# Patient Record
Sex: FEMALE | Race: WHITE | URBAN - METROPOLITAN AREA
[De-identification: names, ages, dates, MRNs, and addresses within clinical notes are randomized per-mention and may not be internally consistent; named-entity substitution may affect disease eponyms.]

---

## 2022-07-19 PROBLEM — E66.3 OVERWEIGHT: Status: ACTIVE | Noted: 2018-12-07

## 2022-07-19 PROBLEM — E66.3 BMI OVER RECOMMENDED: Status: ACTIVE | Noted: 2018-02-12

## 2023-11-02 ENCOUNTER — AMBULATORY SURGICAL CENTER (OUTPATIENT)
Dept: URBAN - METROPOLITAN AREA SURGERY 7 | Facility: SURGERY | Age: 43
End: 2023-11-02

## 2023-11-02 VITALS
SYSTOLIC BLOOD PRESSURE: 131 MMHG | RESPIRATION RATE: 15 BRPM | HEART RATE: 85 BPM | OXYGEN SATURATION: 99 % | DIASTOLIC BLOOD PRESSURE: 85 MMHG | HEART RATE: 81 BPM | RESPIRATION RATE: 16 BRPM | DIASTOLIC BLOOD PRESSURE: 70 MMHG | SYSTOLIC BLOOD PRESSURE: 106 MMHG | SYSTOLIC BLOOD PRESSURE: 106 MMHG | SYSTOLIC BLOOD PRESSURE: 148 MMHG | OXYGEN SATURATION: 100 % | HEART RATE: 95 BPM | HEART RATE: 83 BPM | HEART RATE: 84 BPM | OXYGEN SATURATION: 96 % | HEART RATE: 81 BPM | RESPIRATION RATE: 20 BRPM | SYSTOLIC BLOOD PRESSURE: 98 MMHG | HEART RATE: 85 BPM | HEIGHT: 65 IN | SYSTOLIC BLOOD PRESSURE: 124 MMHG | HEART RATE: 93 BPM | SYSTOLIC BLOOD PRESSURE: 102 MMHG | HEART RATE: 84 BPM | RESPIRATION RATE: 16 BRPM | TEMPERATURE: 97.3 F | HEIGHT: 65 IN | RESPIRATION RATE: 15 BRPM | SYSTOLIC BLOOD PRESSURE: 131 MMHG | RESPIRATION RATE: 20 BRPM | WEIGHT: 220 LBS | DIASTOLIC BLOOD PRESSURE: 85 MMHG | DIASTOLIC BLOOD PRESSURE: 71 MMHG | OXYGEN SATURATION: 99 % | SYSTOLIC BLOOD PRESSURE: 124 MMHG | OXYGEN SATURATION: 96 % | DIASTOLIC BLOOD PRESSURE: 66 MMHG | WEIGHT: 220 LBS | DIASTOLIC BLOOD PRESSURE: 79 MMHG | TEMPERATURE: 97.3 F | HEART RATE: 93 BPM | SYSTOLIC BLOOD PRESSURE: 143 MMHG | HEART RATE: 83 BPM | SYSTOLIC BLOOD PRESSURE: 143 MMHG | SYSTOLIC BLOOD PRESSURE: 98 MMHG | OXYGEN SATURATION: 98 % | OXYGEN SATURATION: 98 % | HEART RATE: 95 BPM | DIASTOLIC BLOOD PRESSURE: 79 MMHG | DIASTOLIC BLOOD PRESSURE: 71 MMHG | OXYGEN SATURATION: 100 % | DIASTOLIC BLOOD PRESSURE: 66 MMHG | DIASTOLIC BLOOD PRESSURE: 64 MMHG | DIASTOLIC BLOOD PRESSURE: 64 MMHG | SYSTOLIC BLOOD PRESSURE: 102 MMHG | SYSTOLIC BLOOD PRESSURE: 148 MMHG | DIASTOLIC BLOOD PRESSURE: 70 MMHG

## 2023-11-02 DIAGNOSIS — Z12.11 ENCOUNTER FOR SCREENING FOR MALIGNANT NEOPLASM OF COLON: ICD-10-CM

## 2023-11-02 DIAGNOSIS — K57.30 DIVERTICULOSIS OF LARGE INTESTINE WITHOUT PERFORATION OR ABS: ICD-10-CM

## 2023-11-02 DIAGNOSIS — Z83.719 FAMILY HISTORY OF COLON POLYPS, UNSPECIFIED: ICD-10-CM

## 2023-11-02 PROCEDURE — 45378 DIAGNOSTIC COLONOSCOPY: CPT | Mod: 33 | Performed by: INTERNAL MEDICINE

## 2023-11-02 PROCEDURE — 45378 DIAGNOSTIC COLONOSCOPY: CPT | Performed by: INTERNAL MEDICINE

## 2024-02-09 NOTE — PROGRESS NOTES
RHEUMATOLOGY NEW PATIENT VISIT    2024      Patient Name: Nita Ayala  : 1980  Medical Record: [id number]      CHIEF COMPLAINT  ELIUD positive  Thyroid peroxidase 483, positive    Pertinent Problems  Celiacs disease     HISTORY OF PRESENT ILLNESS    Nita Ayala is a 43 y.o. female who was referred by Karo Kc. She has been having fatigue that has been going on for one year. Of note she is aware of  her diagnosis of hashimoto about 3 years ago for which she takes Synthroid. ELIUD returned positive.     There is bilateral knee stiffness \" sometimes they feel inflamed\"   Stiffness is worse in the a.m lasting an hour  Overall Discomfort: 2-3/10   Improved with: activity   Worse with: prolonged sitting      Hair loss: Denies  Oral Ulcers: +  Dry eyes and mouth: Dry eyes +   Rashes: Denies  Photosensitivity: Denies   Photophobia: Denies  Joint Pains: Denies  Raynaud's: Denies  Chest Pain: Denies  SOB: Denies  Miscarriages: Denies  Blood Clots: Denies  Seizures/strokes: Denies  Kidney Disease: Acute on chronic kidney disease: denies  Anemia/thrombocytopenia/leukopenia: Denies  Antibodies: ELIUD positive    Denies smoking, etoh, recreational drug use. Mother has celiac's disease, Hashimotos disease, autoimmune hepatitis. 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     Current rheum meds:  none    Past rheum meds:          No data to display                    REVIEW OF SYSTEMS     Constitutional:  Denies fever or chills, decreased appetite, or weight loss   Eyes:  Denies change in visual acuity or eye dryness or irritation  HENT:  Denies dry mouth or oral ulcers  Respiratory:  Denies cough or shortness of breath   Cardiovascular:  Denies chest pain or edema   GI:  Denies abdominal pain, nausea, vomiting, bloody stools or diarrhea   :  Denies dysuria or hematuria  Musculoskeletal:  See HPI  Integument:  Denies rash   Neurologic:  Denies headache, focal weakness or sensory changes   Endocrine:

## 2024-02-12 ENCOUNTER — OFFICE VISIT (OUTPATIENT)
Dept: RHEUMATOLOGY | Age: 44
End: 2024-02-12
Payer: COMMERCIAL

## 2024-02-12 VITALS
HEART RATE: 90 BPM | OXYGEN SATURATION: 99 % | WEIGHT: 225 LBS | SYSTOLIC BLOOD PRESSURE: 115 MMHG | DIASTOLIC BLOOD PRESSURE: 80 MMHG

## 2024-02-12 DIAGNOSIS — Z01.89 ENCOUNTER FOR OTHER SPECIFIED SPECIAL EXAMINATIONS: ICD-10-CM

## 2024-02-12 DIAGNOSIS — M25.50 POLYARTHRALGIA: ICD-10-CM

## 2024-02-12 DIAGNOSIS — R76.8 ANA POSITIVE: Primary | ICD-10-CM

## 2024-02-12 PROCEDURE — 4004F PT TOBACCO SCREEN RCVD TLK: CPT | Performed by: STUDENT IN AN ORGANIZED HEALTH CARE EDUCATION/TRAINING PROGRAM

## 2024-02-12 PROCEDURE — G8421 BMI NOT CALCULATED: HCPCS | Performed by: STUDENT IN AN ORGANIZED HEALTH CARE EDUCATION/TRAINING PROGRAM

## 2024-02-12 PROCEDURE — 99205 OFFICE O/P NEW HI 60 MIN: CPT | Performed by: STUDENT IN AN ORGANIZED HEALTH CARE EDUCATION/TRAINING PROGRAM

## 2024-02-12 PROCEDURE — G8484 FLU IMMUNIZE NO ADMIN: HCPCS | Performed by: STUDENT IN AN ORGANIZED HEALTH CARE EDUCATION/TRAINING PROGRAM

## 2024-02-12 PROCEDURE — G8428 CUR MEDS NOT DOCUMENT: HCPCS | Performed by: STUDENT IN AN ORGANIZED HEALTH CARE EDUCATION/TRAINING PROGRAM

## 2024-02-12 RX ORDER — EPINEPHRINE 0.3 MG/.3ML
0.3 INJECTION SUBCUTANEOUS PRN
COMMUNITY
Start: 2023-10-21

## 2024-02-12 RX ORDER — LEVOTHYROXINE SODIUM 0.03 MG/1
25 TABLET ORAL DAILY
COMMUNITY
Start: 2023-04-21

## 2024-03-19 LAB
INTERPRETATION: NEGATIVE
QUANTIFERON (R) TB GOLD (INCUBATED): <0.35 IU/ML

## 2024-03-23 NOTE — PROGRESS NOTES
synovitis, good ROM,   Shoulder:good ROM,   Knee: no effusion, good ROM,   Ankle:good ROM,   FEET: neg forefoot squeeze test    Spine:  Normal range of motion; no tender points, no obvious deformities.    Neuro:  Alert & oriented x 3, normal motor function, normal sensory function, no focal deficits noted .  Muscle strength: 4/4 in bilateral upper and lower extremities.    Psychiatric: Mood and affect are appropriate, recent and remote memory normal,      LABS AND IMAGING    Office Visit on 02/12/2024   Component Date Value Ref Range Status    WBC 03/13/2024 7.3  3.5 - 10.9 K/uL Final    RBC 03/13/2024 4.86  3.95 - 5.26 M/uL Final    Hemoglobin 03/13/2024 15.6  11.2 - 15.7 G/DL Final    Hematocrit 03/13/2024 45.3  34.0 - 49.0 % Final    MCV 03/13/2024 93.2  80.0 - 100.0 FL Final    MCH 03/13/2024 32.1  26.0 - 34.0 PG Final    MCHC 03/13/2024 34.4  30.7 - 35.5 G/DL Final    RDW 03/13/2024 11.7  <15.1 % Final    Platelets 03/13/2024 311  140 - 400 K/uL Final    MPV 03/13/2024 9.8  7.2 - 11.7 FL Final    Total Protein 03/13/2024 7.5  6.0 - 8.3 G/DL Final    Albumin 03/13/2024 4.4  3.5 - 5.2 G/DL Final    Globulin 03/13/2024 3.1  1.9 - 3.6 G/DL Final    Albumin/Globulin Ratio 03/13/2024 1.4  0.8 - 2.6 RATIO Final    Total Bilirubin 03/13/2024 0.4  0.0 - 1.2 MG/DL Final    Bilirubin, Direct 03/13/2024 <0.2  0.0 - 0.4 MG/DL Final    Bilirubin, Indirect 03/13/2024 UNABLE TO CALCULATE AS THE DIRECT BILIRUBIN IS BELOW DETECTION LIMITS  0.0 - 1.2 MG/DL Final    AST 03/13/2024 21  0 - 55 U/L Final    ALT 03/13/2024 13  0 - 60 U/L Final    Alkaline Phosphatase 03/13/2024 97  23 - 144 U/L Final    Vit D, 25-Hydroxy 03/13/2024 59  30 - 100 NG/ML Final    Sed Rate 03/13/2024 9  0 - 20 MM/HR Final    Interpretation 03/13/2024 Negative  NEGATIVE Final    Quantiferon(r) TB Gold (Incubated) 03/13/2024 <0.35  IU/mL Final       Assessment   Patient is a pleasant 43-year-old female with history of Hashimoto's, celiac's disease and family

## 2024-03-25 ENCOUNTER — OFFICE VISIT (OUTPATIENT)
Dept: RHEUMATOLOGY | Age: 44
End: 2024-03-25
Payer: COMMERCIAL

## 2024-03-25 VITALS
HEART RATE: 84 BPM | DIASTOLIC BLOOD PRESSURE: 75 MMHG | WEIGHT: 228 LBS | SYSTOLIC BLOOD PRESSURE: 125 MMHG | OXYGEN SATURATION: 97 %

## 2024-03-25 DIAGNOSIS — R76.8 ANA POSITIVE: ICD-10-CM

## 2024-03-25 DIAGNOSIS — E06.3 AUTOIMMUNE THYROIDITIS: Primary | ICD-10-CM

## 2024-03-25 PROCEDURE — G8421 BMI NOT CALCULATED: HCPCS | Performed by: STUDENT IN AN ORGANIZED HEALTH CARE EDUCATION/TRAINING PROGRAM

## 2024-03-25 PROCEDURE — G8484 FLU IMMUNIZE NO ADMIN: HCPCS | Performed by: STUDENT IN AN ORGANIZED HEALTH CARE EDUCATION/TRAINING PROGRAM

## 2024-03-25 PROCEDURE — 99214 OFFICE O/P EST MOD 30 MIN: CPT | Performed by: STUDENT IN AN ORGANIZED HEALTH CARE EDUCATION/TRAINING PROGRAM

## 2024-03-25 PROCEDURE — 4004F PT TOBACCO SCREEN RCVD TLK: CPT | Performed by: STUDENT IN AN ORGANIZED HEALTH CARE EDUCATION/TRAINING PROGRAM

## 2024-03-25 PROCEDURE — G8427 DOCREV CUR MEDS BY ELIG CLIN: HCPCS | Performed by: STUDENT IN AN ORGANIZED HEALTH CARE EDUCATION/TRAINING PROGRAM

## 2024-03-25 NOTE — PATIENT INSTRUCTIONS
Follow up with PCP for Hashimoto   RTC as needed if new issues arise      Lab Locations:    [facility]  [address] [address]    Central [phone]